# Patient Record
Sex: FEMALE | Race: WHITE | HISPANIC OR LATINO
[De-identification: names, ages, dates, MRNs, and addresses within clinical notes are randomized per-mention and may not be internally consistent; named-entity substitution may affect disease eponyms.]

---

## 2024-04-11 ENCOUNTER — ASOB RESULT (OUTPATIENT)
Age: 39
End: 2024-04-11

## 2024-04-11 ENCOUNTER — APPOINTMENT (OUTPATIENT)
Dept: ANTEPARTUM | Facility: CLINIC | Age: 39
End: 2024-04-11
Payer: MEDICAID

## 2024-04-11 PROCEDURE — 76801 OB US < 14 WKS SINGLE FETUS: CPT

## 2024-05-29 ENCOUNTER — APPOINTMENT (OUTPATIENT)
Dept: ANTEPARTUM | Facility: CLINIC | Age: 39
End: 2024-05-29

## 2024-05-31 ENCOUNTER — APPOINTMENT (OUTPATIENT)
Dept: MATERNAL FETAL MEDICINE | Facility: CLINIC | Age: 39
End: 2024-05-31
Payer: MEDICAID

## 2024-05-31 ENCOUNTER — APPOINTMENT (OUTPATIENT)
Dept: ANTEPARTUM | Facility: CLINIC | Age: 39
End: 2024-05-31
Payer: MEDICAID

## 2024-05-31 ENCOUNTER — ASOB RESULT (OUTPATIENT)
Age: 39
End: 2024-05-31

## 2024-05-31 PROCEDURE — 76811 OB US DETAILED SNGL FETUS: CPT

## 2024-05-31 PROCEDURE — 99205 OFFICE O/P NEW HI 60 MIN: CPT | Mod: 25

## 2024-05-31 PROCEDURE — 76817 TRANSVAGINAL US OBSTETRIC: CPT

## 2024-05-31 PROCEDURE — ZZZZZ: CPT

## 2024-06-04 ENCOUNTER — APPOINTMENT (OUTPATIENT)
Dept: MATERNAL FETAL MEDICINE | Facility: CLINIC | Age: 39
End: 2024-06-04
Payer: MEDICAID

## 2024-06-04 ENCOUNTER — ASOB RESULT (OUTPATIENT)
Age: 39
End: 2024-06-04

## 2024-06-04 PROCEDURE — 99442: CPT | Mod: 93

## 2024-06-12 DIAGNOSIS — O35.9XX0 MATERNAL CARE FOR (SUSPECTED) FETAL ABNORMALITY AND DAMAGE, UNSPECIFIED, NOT APPLICABLE OR UNSPECIFIED: ICD-10-CM

## 2024-06-14 ENCOUNTER — APPOINTMENT (OUTPATIENT)
Dept: MATERNAL FETAL MEDICINE | Facility: CLINIC | Age: 39
End: 2024-06-14
Payer: MEDICAID

## 2024-06-14 ENCOUNTER — APPOINTMENT (OUTPATIENT)
Dept: ANTEPARTUM | Facility: CLINIC | Age: 39
End: 2024-06-14
Payer: MEDICAID

## 2024-06-14 ENCOUNTER — ASOB RESULT (OUTPATIENT)
Age: 39
End: 2024-06-14

## 2024-06-14 ENCOUNTER — LABORATORY RESULT (OUTPATIENT)
Age: 39
End: 2024-06-14

## 2024-06-14 PROCEDURE — 36415 COLL VENOUS BLD VENIPUNCTURE: CPT

## 2024-06-14 PROCEDURE — 76815 OB US LIMITED FETUS(S): CPT

## 2024-06-14 PROCEDURE — 76820 UMBILICAL ARTERY ECHO: CPT

## 2024-06-14 PROCEDURE — ZZZZZ: CPT

## 2024-06-14 PROCEDURE — 99213 OFFICE O/P EST LOW 20 MIN: CPT | Mod: 25

## 2024-06-16 LAB
CMV IGG SERPL QL: 1.9 U/ML
CMV IGG SERPL-IMP: POSITIVE
CMV IGM SERPL QL: <8 AU/ML
CMV IGM SERPL QL: NEGATIVE
T GONDII AB SER-IMP: NEGATIVE
T GONDII AB SER-IMP: POSITIVE
T GONDII IGG SER QL: 42.2 IU/ML
T GONDII IGM SER QL: <3 AU/ML

## 2024-06-20 LAB
B19V IGG SER QL IA: 0.16 INDEX
B19V IGG+IGM SER-IMP: NEGATIVE
B19V IGG+IGM SER-IMP: NORMAL
B19V IGM FLD-ACNC: 0.18 INDEX
B19V IGM SER-ACNC: NEGATIVE

## 2024-06-25 ENCOUNTER — NON-APPOINTMENT (OUTPATIENT)
Age: 39
End: 2024-06-25

## 2024-06-28 ENCOUNTER — APPOINTMENT (OUTPATIENT)
Dept: ANTEPARTUM | Facility: CLINIC | Age: 39
End: 2024-06-28

## 2024-07-01 ENCOUNTER — ASOB RESULT (OUTPATIENT)
Age: 39
End: 2024-07-01

## 2024-07-01 ENCOUNTER — APPOINTMENT (OUTPATIENT)
Dept: ANTEPARTUM | Facility: CLINIC | Age: 39
End: 2024-07-01
Payer: MEDICAID

## 2024-07-01 PROCEDURE — 76821 MIDDLE CEREBRAL ARTERY ECHO: CPT | Mod: 26,59

## 2024-07-01 PROCEDURE — 76816 OB US FOLLOW-UP PER FETUS: CPT | Mod: 26

## 2024-07-01 PROCEDURE — 76819 FETAL BIOPHYS PROFIL W/O NST: CPT | Mod: 26,59

## 2024-07-01 PROCEDURE — 76820 UMBILICAL ARTERY ECHO: CPT | Mod: 26,59

## 2024-07-05 ENCOUNTER — TRANSCRIPTION ENCOUNTER (OUTPATIENT)
Age: 39
End: 2024-07-05

## 2024-07-05 ENCOUNTER — ASOB RESULT (OUTPATIENT)
Age: 39
End: 2024-07-05

## 2024-07-05 ENCOUNTER — APPOINTMENT (OUTPATIENT)
Dept: ANTEPARTUM | Facility: CLINIC | Age: 39
End: 2024-07-05

## 2024-07-05 ENCOUNTER — APPOINTMENT (OUTPATIENT)
Dept: ANTEPARTUM | Facility: CLINIC | Age: 39
End: 2024-07-05
Payer: MEDICAID

## 2024-07-05 PROCEDURE — 76815 OB US LIMITED FETUS(S): CPT | Mod: 26

## 2024-07-05 PROCEDURE — 76820 UMBILICAL ARTERY ECHO: CPT | Mod: 26,59

## 2024-07-05 PROCEDURE — 59897 UNLISTED FETAL INVAS PX W/US: CPT

## 2024-07-06 ENCOUNTER — TRANSCRIPTION ENCOUNTER (OUTPATIENT)
Age: 39
End: 2024-07-06

## 2024-08-05 ENCOUNTER — LABORATORY RESULT (OUTPATIENT)
Age: 39
End: 2024-08-05

## 2024-08-05 ENCOUNTER — APPOINTMENT (OUTPATIENT)
Dept: OBGYN | Facility: CLINIC | Age: 39
End: 2024-08-05

## 2024-08-05 PROCEDURE — G0444 DEPRESSION SCREEN ANNUAL: CPT | Mod: 59

## 2024-08-05 PROCEDURE — 99385 PREV VISIT NEW AGE 18-39: CPT

## 2024-08-05 NOTE — DISCUSSION/SUMMARY
[FreeTextEntry1] : 38yo  - new pt / hospital follow up.  Pt is s/p KCL/D&E @ 24wks in setting of severe IUGR/ AEDV/elevated MCA dopplers with bleeding placenta previa and PEC .  # psycho/social - emotional support offered today.  - [ ]SW tasked to reach out to pt w/ support services.  pt agreeable - depression screen reviwed - PHQ9 score 15-  in setting of recent traumatic loss.  Pt grieving.  Denies SI/ HI.   # h/o Gestational diabetes -2 hour glucose tolerance test today - pt to f/u with PCP  # H/o PEC (with severe IUGR) - [ ]Cardio Ob consult emailed - pt aware and expecting call.   #Healthcare Maintenance -[ ]pap/ hpv collected - [ ]gc/ct sent - discussed contraception- pt declined all at this time.  Pt states she is not interested in gettng pregnant again any time in future.   Patient understands and agrees to plan, has no further questions. Will follow up annually or as needed.  SARITA CatesS. Ok Damon, PAC

## 2024-08-05 NOTE — PHYSICAL EXAM
[Chaperone Present] : A chaperone was present in the examining room during all aspects of the physical examination [Appropriately responsive] : appropriately responsive [Alert] : alert [No Acute Distress] : no acute distress [No Lymphadenopathy] : no lymphadenopathy [Soft] : soft [Non-tender] : non-tender [Non-distended] : non-distended [No HSM] : No HSM [No Lesions] : no lesions [No Mass] : no mass [Oriented x3] : oriented x3 [Examination Of The Breasts] : a normal appearance [No Masses] : no breast masses were palpable [Labia Majora] : normal [Labia Minora] : normal [Normal] : normal [Uterine Adnexae] : normal [[id number]] : A chaperone was present during the pelvic exam. [FreeTextEntry2] : JEREMIAH [Pink Rugae] : pink rugae [No Bleeding] : There was no active vaginal bleeding [Normal Position] : in a normal position [Tenderness] : nontender [Enlarged ___ wks] : not enlarged [FreeTextEntry5] : Cervical ectropion

## 2024-08-05 NOTE — REASON FOR VISIT
[Initial] : an initial consultation for [[employer] ] : provided by [employer]   [Time Spent: ____ minutes] : Total time spent using  services: [unfilled] minutes. The patient's primary language is not English thus required  services. [Interpreters_IDNumber] : [id number] [TWNoteComboBox1] : [place]orese

## 2024-08-05 NOTE — HISTORY OF PRESENT ILLNESS
[FreeTextEntry1] : 40 y/o [id number]  s/p KCL/TOP @24wks in setting of severe IUGR/AEDV/elevated MCA dopplers with complete previa (bleeding) and PEC presents as new pt for follow up and to establish care. Pt was d/c 24 from [facility]  The patient presented to [facility] Emergency Department as a TI @ 24 weeks GA in the setting of placenta previa and severe IUGR, AEDV/elevated MCA dopplers where she also met criteria for preeclampsia at the time. Patient opted to undergo D&E for termination of pregnancy and is now one month post-op. Prenatal course was complicated by gestational diabetes, and she is scheduled to undergo 2 hour glucose tolerance test today. She denies current vaginal bleeding. Blood pressure is WNL at today's visit, though patient reports occasional generalized abdominal pain and headaches. Denies other preeclampsia symptoms at this time. The patient has 2 living children, both delivered via  with an uncomplicated prenatal course. Patient is not using any form of contraceptive at this time and does not wish to and is not actively trying to conceive. She denies alcohol use, smoking or illicit drug use. She denies allergies to any medications. She is currently living at home with her sister and feels safe. Past medical history is positive for pre-diabetes for which she has not seen a PCP for in several years. Family history is positive for diabetes mellitus. Surgical history includes D&E. PHQ9=15, patient endorses decreased appetite and difficulty sleeping. Denies thoughts of self-harm or suicidal ideation. She is due for annual pap smear with cytology today. She denies fever, chills, n/v/d/c, dysuria, urinary frequency/urgency, breast changes.    Obhx: 2 XFTNSVD ([facility]),   1 x 4wk TOP (complications as above) GYnhx: denies abnl paps/ stds/ cysts/ fibroids.   Unknown last PAP Pmhx: preDM (in [facility]) Shx: D&E meds: none ALL: NKDA Sochx: denies tob/etoh/drug.  Denies dv or abuse issues.  Just immigrated from [facility]- discovered +pregnancy test at immigration.  Famhx: "fam hx of diabetes".  Denies any cancers  Does not have PCP - PHQ 9: 15  (denies SI/HI)- pt struggling with trauma of loss.   Difficulty sleeping.

## 2024-08-27 ENCOUNTER — NON-APPOINTMENT (OUTPATIENT)
Age: 39
End: 2024-08-27

## 2024-08-27 ENCOUNTER — APPOINTMENT (OUTPATIENT)
Dept: CARDIOLOGY | Facility: CLINIC | Age: 39
End: 2024-08-27
Payer: MEDICAID

## 2024-08-27 VITALS
BODY MASS INDEX: 27.38 KG/M2 | WEIGHT: 145 LBS | SYSTOLIC BLOOD PRESSURE: 127 MMHG | OXYGEN SATURATION: 100 % | HEART RATE: 66 BPM | DIASTOLIC BLOOD PRESSURE: 75 MMHG | HEIGHT: 61 IN

## 2024-08-27 VITALS — DIASTOLIC BLOOD PRESSURE: 60 MMHG | SYSTOLIC BLOOD PRESSURE: 112 MMHG

## 2024-08-27 DIAGNOSIS — R06.00 DYSPNEA, UNSPECIFIED: ICD-10-CM

## 2024-08-27 DIAGNOSIS — O14.00 MILD TO MODERATE PRE-ECLAMPSIA, UNSPECIFIED TRIMESTER: ICD-10-CM

## 2024-08-27 PROCEDURE — 99204 OFFICE O/P NEW MOD 45 MIN: CPT | Mod: 25

## 2024-08-27 PROCEDURE — G2211 COMPLEX E/M VISIT ADD ON: CPT | Mod: NC,1L

## 2024-08-27 PROCEDURE — 93000 ELECTROCARDIOGRAM COMPLETE: CPT

## 2024-08-27 NOTE — REASON FOR VISIT
[Other: ______] : provided by MECHELLE [Interpreters_IDNumber] : [id number] [Interpreters_FullName] : Nan

## 2024-08-27 NOTE — HISTORY OF PRESENT ILLNESS
[FreeTextEntry1] : 39F Kinyarwanda-speaking  recent termination of pregnancy in 2024 at 24 weeks due to placental previa and severe IUGR had preeclampsia but not require antihypertensive medication, refer for CardioOB evaluation.   Reports home SBP 90-100s recently, two prior pregnancies (last 12 years ago) without preeclampsia or BP issue but she recollect last year with SBP 180s was started on a medication by her PCP took only for 15 days, was also taking low dose ASA at some point, no recent PCP follow up. Has intermittent shortness of breath since discharge in July.    No CAD/stroke in family, aunt with HTN Nonsmoker, social alcohol  Not working currently

## 2024-08-27 NOTE — HISTORY OF PRESENT ILLNESS
[FreeTextEntry1] : 39F Ukrainian-speaking  recent termination of pregnancy in 2024 at 24 weeks due to placental previa and severe IUGR had preeclampsia but not require antihypertensive medication, refer for CardioOB evaluation.   Reports home SBP 90-100s recently, two prior pregnancies (last 12 years ago) without preeclampsia or BP issue but she recollect last year with SBP 180s was started on a medication by her PCP took only for 15 days, was also taking low dose ASA at some point, no recent PCP follow up. Has intermittent shortness of breath since discharge in July.    No CAD/stroke in family, aunt with HTN Nonsmoker, social alcohol  Not working currently

## 2024-08-27 NOTE — REASON FOR VISIT
[Other: ______] : provided by MECHELLE [Interpreters_IDNumber] : [id number] [Interpreters_FullName] : Nan

## 2024-08-27 NOTE — REASON FOR VISIT
[Other: ______] : provided by MECHELLE [Interpreters_IDNumber] : [id number] [Interpreters_FullName] : Nan

## 2024-08-27 NOTE — HISTORY OF PRESENT ILLNESS
[FreeTextEntry1] : 39F Khmer-speaking  recent termination of pregnancy in 2024 at 24 weeks due to placental previa and severe IUGR had preeclampsia but not require antihypertensive medication, refer for CardioOB evaluation.   Reports home SBP 90-100s recently, two prior pregnancies (last 12 years ago) without preeclampsia or BP issue but she recollect last year with SBP 180s was started on a medication by her PCP took only for 15 days, was also taking low dose ASA at some point, no recent PCP follow up. Has intermittent shortness of breath since discharge in July.    No CAD/stroke in family, aunt with HTN Nonsmoker, social alcohol  Not working currently

## 2024-08-27 NOTE — DISCUSSION/SUMMARY
[FreeTextEntry1] : 39F Lithuanian-speaking  recent termination of pregnancy in 2024 at 24 weeks due to placental previa and severe IUGR had preeclampsia but not require antihypertensive medication, refer for CardioOB evaluation.   Remains normotensive not requiring antihypertensive use, but unclear prior episode of labile HTN was at one point on BP med for 2 weeks, advised close follow up with PCP and routine BP monitoring at home as can be at risk for early onset HTN, low salt diet and routine exercise recommended. Recent exertional dyspnea and with EKG nonspecific precordial T-wave will obtain Echocardiogram for structural heart function assessment. Defer stress testing given low risk factors for CAD.    Follow up as needed if Echo is normal.  [EKG obtained to assist in diagnosis and management of assessed problem(s)] : EKG obtained to assist in diagnosis and management of assessed problem(s)

## 2024-08-27 NOTE — DISCUSSION/SUMMARY
[FreeTextEntry1] : 39F Mohawk-speaking  recent termination of pregnancy in 2024 at 24 weeks due to placental previa and severe IUGR had preeclampsia but not require antihypertensive medication, refer for CardioOB evaluation.   Remains normotensive not requiring antihypertensive use, but unclear prior episode of labile HTN was at one point on BP med for 2 weeks, advised close follow up with PCP and routine BP monitoring at home as can be at risk for early onset HTN, low salt diet and routine exercise recommended. Recent exertional dyspnea and with EKG nonspecific precordial T-wave will obtain Echocardiogram for structural heart function assessment. Defer stress testing given low risk factors for CAD.    Follow up as needed if Echo is normal.  [EKG obtained to assist in diagnosis and management of assessed problem(s)] : EKG obtained to assist in diagnosis and management of assessed problem(s)

## 2024-08-27 NOTE — DISCUSSION/SUMMARY
[FreeTextEntry1] : 39F Hungarian-speaking  recent termination of pregnancy in 2024 at 24 weeks due to placental previa and severe IUGR had preeclampsia but not require antihypertensive medication, refer for CardioOB evaluation.   Remains normotensive not requiring antihypertensive use, but unclear prior episode of labile HTN was at one point on BP med for 2 weeks, advised close follow up with PCP and routine BP monitoring at home as can be at risk for early onset HTN, low salt diet and routine exercise recommended. Recent exertional dyspnea and with EKG nonspecific precordial T-wave will obtain Echocardiogram for structural heart function assessment. Defer stress testing given low risk factors for CAD.    Follow up as needed if Echo is normal.  [EKG obtained to assist in diagnosis and management of assessed problem(s)] : EKG obtained to assist in diagnosis and management of assessed problem(s)

## 2024-09-09 ENCOUNTER — APPOINTMENT (OUTPATIENT)
Dept: CARDIOLOGY | Facility: CLINIC | Age: 39
End: 2024-09-09
Payer: MEDICAID

## 2024-09-09 PROCEDURE — 93306 TTE W/DOPPLER COMPLETE: CPT
